# Patient Record
Sex: FEMALE | URBAN - METROPOLITAN AREA
[De-identification: names, ages, dates, MRNs, and addresses within clinical notes are randomized per-mention and may not be internally consistent; named-entity substitution may affect disease eponyms.]

---

## 2022-06-28 ENCOUNTER — APPOINTMENT (OUTPATIENT)
Dept: URBAN - METROPOLITAN AREA CLINIC 243 | Age: 52
Setting detail: DERMATOLOGY
End: 2022-06-28

## 2022-06-28 DIAGNOSIS — L71.0 PERIORAL DERMATITIS: ICD-10-CM

## 2022-06-28 DIAGNOSIS — L82.0 INFLAMED SEBORRHEIC KERATOSIS: ICD-10-CM

## 2022-06-28 DIAGNOSIS — L28.0 LICHEN SIMPLEX CHRONICUS: ICD-10-CM

## 2022-06-28 DIAGNOSIS — B07.8 OTHER VIRAL WARTS: ICD-10-CM

## 2022-06-28 PROCEDURE — OTHER PRESCRIPTION: OTHER

## 2022-06-28 PROCEDURE — OTHER COUNSELING: OTHER

## 2022-06-28 PROCEDURE — OTHER PRESCRIPTION MEDICATION MANAGEMENT: OTHER

## 2022-06-28 PROCEDURE — OTHER TREATMENT REGIMEN: OTHER

## 2022-06-28 PROCEDURE — 17110 DESTRUCT B9 LESION 1-14: CPT

## 2022-06-28 PROCEDURE — 99213 OFFICE O/P EST LOW 20 MIN: CPT | Mod: 25

## 2022-06-28 PROCEDURE — OTHER LIQUID NITROGEN: OTHER

## 2022-06-28 RX ORDER — TRIAMCINOLONE ACETONIDE 1 MG/G
CREAM TOPICAL BID
Qty: 80 | Refills: 1 | Status: ERX | COMMUNITY
Start: 2022-06-28

## 2022-06-28 RX ORDER — CLINDAMYCIN PHOSPHATE 10 MG/ML
LOTION TOPICAL
Qty: 60 | Refills: 1 | Status: ERX | COMMUNITY
Start: 2022-06-28

## 2022-06-28 RX ORDER — MINOCYCLINE HYDROCHLORIDE 100 MG/1
CAPSULE ORAL
Qty: 30 | Refills: 0 | Status: ERX | COMMUNITY
Start: 2022-06-28

## 2022-06-28 RX ORDER — PIMECROLIMUS 10 MG/G
CREAM TOPICAL
Qty: 30 | Refills: 2 | Status: ERX | COMMUNITY
Start: 2022-06-28

## 2022-06-28 ASSESSMENT — LOCATION SIMPLE DESCRIPTION DERM
LOCATION SIMPLE: LEFT MIDDLE FINGER
LOCATION SIMPLE: LEFT ANTERIOR NECK
LOCATION SIMPLE: CHIN

## 2022-06-28 ASSESSMENT — LOCATION ZONE DERM
LOCATION ZONE: FINGER
LOCATION ZONE: NECK
LOCATION ZONE: FACE

## 2022-06-28 ASSESSMENT — LOCATION DETAILED DESCRIPTION DERM
LOCATION DETAILED: LEFT MID DORSAL MIDDLE FINGER
LOCATION DETAILED: LEFT CHIN
LOCATION DETAILED: LEFT INFERIOR LATERAL NECK

## 2022-06-28 NOTE — PROCEDURE: TREATMENT REGIMEN
Plan: Slowly taper off of meds to reduce risk of rebound flare.  Will re-evaluate in 3 months.  Pt verbalized understanding.
Detail Level: Zone
Initiate Treatment: Minocycline 100mg qd x 1 month\\nClindamycin Lotion bid prior to Pimecrolimus bid.

## 2022-06-28 NOTE — PROCEDURE: LIQUID NITROGEN
Render Note In Bullet Format When Appropriate: No
Number Of Freeze-Thaw Cycles: 2 freeze-thaw cycles
Show Spray Paint Technique Variable?: Yes
Duration Of Freeze Thaw-Cycle (Seconds): 5-10
Spray Paint Text: The liquid nitrogen was applied to the skin utilizing a spray paint frosting technique.
Medical Necessity Clause: This procedure was medically necessary because the lesions that were treated were:
Detail Level: Detailed
Consent: The patient's consent was obtained including but not limited to risks of crusting, scabbing, blistering, scarring, darker or lighter pigmentary change, recurrence, incomplete removal and infection.
Medical Necessity Information: It is in your best interest to select a reason for this procedure from the list below. All of these items fulfill various CMS LCD requirements except the new and changing color options.
Post-Care Instructions: I reviewed with the patient in detail post-care instructions. Patient is to wear sunprotection, and avoid picking at any of the treated lesions. Pt may apply Vaseline to crusted or scabbing areas.

## 2022-06-28 NOTE — PROCEDURE: PRESCRIPTION MEDICATION MANAGEMENT
Detail Level: Zone
Initiate Treatment: Triamcinolone 0.1% cream apply BID
Render In Strict Bullet Format?: No

## 2022-09-27 ENCOUNTER — APPOINTMENT (OUTPATIENT)
Dept: URBAN - METROPOLITAN AREA CLINIC 243 | Age: 52
Setting detail: DERMATOLOGY
End: 2022-09-28

## 2022-09-27 DIAGNOSIS — L71.0 PERIORAL DERMATITIS: ICD-10-CM

## 2022-09-27 DIAGNOSIS — B07.8 OTHER VIRAL WARTS: ICD-10-CM

## 2022-09-27 DIAGNOSIS — L28.0 LICHEN SIMPLEX CHRONICUS: ICD-10-CM

## 2022-09-27 DIAGNOSIS — L82.0 INFLAMED SEBORRHEIC KERATOSIS: ICD-10-CM

## 2022-09-27 PROCEDURE — OTHER PRESCRIPTION MEDICATION MANAGEMENT: OTHER

## 2022-09-27 PROCEDURE — OTHER COUNSELING: OTHER

## 2022-09-27 PROCEDURE — OTHER TREATMENT REGIMEN: OTHER

## 2022-09-27 PROCEDURE — OTHER LIQUID NITROGEN: OTHER

## 2022-09-27 PROCEDURE — 99213 OFFICE O/P EST LOW 20 MIN: CPT | Mod: 25

## 2022-09-27 PROCEDURE — 17110 DESTRUCT B9 LESION 1-14: CPT

## 2022-09-27 ASSESSMENT — LOCATION DETAILED DESCRIPTION DERM
LOCATION DETAILED: LEFT CHIN
LOCATION DETAILED: LEFT INFERIOR LATERAL MALAR CHEEK
LOCATION DETAILED: LEFT INFERIOR LATERAL NECK
LOCATION DETAILED: LEFT LOWER CUTANEOUS LIP
LOCATION DETAILED: LEFT MID DORSAL MIDDLE FINGER
LOCATION DETAILED: LEFT LOWER CUTANEOUS LIP
LOCATION DETAILED: LEFT LATERAL MALAR CHEEK

## 2022-09-27 ASSESSMENT — LOCATION SIMPLE DESCRIPTION DERM
LOCATION SIMPLE: LEFT CHEEK
LOCATION SIMPLE: LEFT LIP
LOCATION SIMPLE: LEFT LIP
LOCATION SIMPLE: LEFT ANTERIOR NECK
LOCATION SIMPLE: LEFT MIDDLE FINGER
LOCATION SIMPLE: CHIN

## 2022-09-27 ASSESSMENT — LOCATION ZONE DERM
LOCATION ZONE: FINGER
LOCATION ZONE: LIP
LOCATION ZONE: LIP
LOCATION ZONE: NECK
LOCATION ZONE: FACE

## 2022-09-27 NOTE — PROCEDURE: TREATMENT REGIMEN
Detail Level: Zone
Continue Regimen: Minocycline 100mg qd x 1 month\\nClindamycin Lotion bid prior to Pimecrolimus bid.\\nPimecrolimus topical cream apply once daily to face following clindamycin
Plan: Slowly taper off of meds to reduce risk of rebound flare.  Will re-evaluate in 3 months if needed. Pt verbalized understanding.

## 2022-09-27 NOTE — HPI: FOLLOW-UP
Additional History: Pt was also seen for perioral dermatitis and states improvement \\nPt was also seen for lichen simplex chronicis around the neck and states improvement \\nPt had ISK treated with ln2 on the neck and states improvement

## 2022-09-27 NOTE — PROCEDURE: PRESCRIPTION MEDICATION MANAGEMENT
Render In Strict Bullet Format?: No
Modify Regimen: Triamcinolone 0.1% cream apply BID as needed for when flaring
Detail Level: Zone

## 2022-09-27 NOTE — HPI: FOLLOW-UP
During The Previous Visit, The Patient....: Was seen for vv on left dorsal middle digit and treated with ln2

## 2022-09-27 NOTE — PROCEDURE: LIQUID NITROGEN
Spray Paint Technique: No
Medical Necessity Information: It is in your best interest to select a reason for this procedure from the list below. All of these items fulfill various CMS LCD requirements except the new and changing color options.
Show Aperture Variable?: Yes
Consent: The patient's consent was obtained including but not limited to risks of crusting, scabbing, blistering, scarring, darker or lighter pigmentary change, recurrence, incomplete removal and infection.
Spray Paint Text: The liquid nitrogen was applied to the skin utilizing a spray paint frosting technique.
Post-Care Instructions: I reviewed with the patient in detail post-care instructions. Patient is to wear sunprotection, and avoid picking at any of the treated lesions. Pt may apply Vaseline to crusted or scabbing areas.
Number Of Freeze-Thaw Cycles: 2 freeze-thaw cycles
Detail Level: Detailed
Duration Of Freeze Thaw-Cycle (Seconds): 5-10
Medical Necessity Clause: This procedure was medically necessary because the lesions that were treated were: